# Patient Record
Sex: MALE | Race: WHITE
[De-identification: names, ages, dates, MRNs, and addresses within clinical notes are randomized per-mention and may not be internally consistent; named-entity substitution may affect disease eponyms.]

---

## 2017-08-16 ENCOUNTER — HOSPITAL ENCOUNTER (OUTPATIENT)
Dept: HOSPITAL 62 - END | Age: 82
Discharge: HOME | End: 2017-08-16
Attending: INTERNAL MEDICINE
Payer: COMMERCIAL

## 2017-08-16 VITALS — SYSTOLIC BLOOD PRESSURE: 142 MMHG | DIASTOLIC BLOOD PRESSURE: 71 MMHG

## 2017-08-16 DIAGNOSIS — Z87.891: ICD-10-CM

## 2017-08-16 DIAGNOSIS — Z79.899: ICD-10-CM

## 2017-08-16 DIAGNOSIS — K52.9: Primary | ICD-10-CM

## 2017-08-16 DIAGNOSIS — K64.8: ICD-10-CM

## 2017-08-16 DIAGNOSIS — Z88.0: ICD-10-CM

## 2017-08-16 DIAGNOSIS — Z85.850: ICD-10-CM

## 2017-08-16 DIAGNOSIS — N40.0: ICD-10-CM

## 2017-08-16 DIAGNOSIS — E78.2: ICD-10-CM

## 2017-08-16 DIAGNOSIS — K57.30: ICD-10-CM

## 2017-08-16 DIAGNOSIS — I25.10: ICD-10-CM

## 2017-08-16 DIAGNOSIS — G47.33: ICD-10-CM

## 2017-08-16 DIAGNOSIS — Z79.82: ICD-10-CM

## 2017-08-16 DIAGNOSIS — E89.0: ICD-10-CM

## 2017-08-16 PROCEDURE — 0DBF8ZX EXCISION OF RIGHT LARGE INTESTINE, VIA NATURAL OR ARTIFICIAL OPENING ENDOSCOPIC, DIAGNOSTIC: ICD-10-PCS | Performed by: INTERNAL MEDICINE

## 2017-08-16 PROCEDURE — 88305 TISSUE EXAM BY PATHOLOGIST: CPT

## 2017-08-16 PROCEDURE — 45380 COLONOSCOPY AND BIOPSY: CPT

## 2017-08-16 RX ADMIN — MIDAZOLAM HYDROCHLORIDE ONE MG: 1 INJECTION, SOLUTION INTRAMUSCULAR; INTRAVENOUS at 11:46

## 2017-08-16 RX ADMIN — MIDAZOLAM HYDROCHLORIDE ONE MG: 1 INJECTION, SOLUTION INTRAMUSCULAR; INTRAVENOUS at 11:51

## 2017-08-16 NOTE — OPERATIVE REPORT
Operative Report


DATE OF SURGERY: 08/16/17


Operative Report: 





The risks, benefits and alternatives of the procedure including risks of 

bleeding, perforation requiring surgery are explained to the patient detail and 

informed consent is obtained.  Patient was taken back to the endoscopy suite 

and placed in the left, lateral decubital position.  Timeout was called.  

Conscious sedation medications are provided.  A rectal examination is done 

which did not reveal any masses, tears or fissures.  An Olympus videoscope was 

inserted into the patient's rectum.  The scope was then carefully advanced all 

the way to the cecum.  The cecum was identified by the usual anatomical 

landmarks including the ileocecal valve as well as the appendiceal office.  

Photodocumentation was obtained.  Prep was good.  Scope was then sequentially 

pulled back via the various segments of the colon including the ascending colon

, hepatic flexure, transverse colon, splenic flexure, descending colon finding 

to the rectosigmoid portions of the colon.  Retroflexion maneuver is performed.


Patient does have a long redundant colon.


PREOPERATIVE DIAGNOSIS: Personal history of polyp.  Weight loss.  Change of 

bowel habits.  Diarrhea


POSTOPERATIVE DIAGNOSIS: Right side inflammation status post biopsy rule out 

lymphocytic, collagenous colitis.  Sigmoid diverticulosis.  Internal hemorrhoids


OPERATION: Colonoscopy with biopsy


SURGEON: KRISTAL REEDER


ANESTHESIA: Moderate Sedation - 3 mg of Versed, 50 mcg of fentanyl.  Conscious 

sedation monitoring time 30 minutes.


TISSUE REMOVED OR ALTERED: Right colon specimen obtained


COMPLICATIONS: 





None.


ESTIMATED BLOOD LOSS: None.


INTRAOPERATIVE FINDINGS: As described above.


PROCEDURE: 





Patient tolerated procedure well.


No immediate postprocedure complications are noted.


Patient discharged in good condition.


Discharge date 8/16/2017.


Discharge diet: Regular.


Discharge activity: Regular.


2-3 week follow-up to discuss findings.


Patient is instructed to call the office or proceed to the emergency room 

should there be any further problems or questions.


We will wait on pathology.

## 2018-10-18 ENCOUNTER — HOSPITAL ENCOUNTER (OUTPATIENT)
Dept: HOSPITAL 62 - OD | Age: 83
End: 2018-10-18
Attending: NURSE PRACTITIONER
Payer: MEDICARE

## 2018-10-18 DIAGNOSIS — M54.42: Primary | ICD-10-CM

## 2018-10-18 DIAGNOSIS — R10.32: ICD-10-CM

## 2018-10-18 PROCEDURE — 72110 X-RAY EXAM L-2 SPINE 4/>VWS: CPT

## 2018-10-18 NOTE — RADIOLOGY REPORT (SQ)
EXAM DESCRIPTION:  HIP LEFT AP/LATERAL



COMPLETED DATE/TIME:  10/18/2018 12:27 pm



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
ADRANT PAIN M54.42  LUMBAGO WITH SCIATICA, LEFT SIDE



COMPARISON:  Lumbar spine films same date

CT abdomen pelvis 8/11/2014



NUMBER OF VIEWS:  Two views.



TECHNIQUE:  AP pelvis and additional frog-leg view of the left hip.



LIMITATIONS:  None.



FINDINGS:  MINERALIZATION: Osteopenic

LEFT HIP: No fracture or dislocation.  No worrisome bone lesions.  Mild joint space narrowing is pres
ent with bony spurring along the acetabular rim.

RIGHT HIP: No fracture or dislocation.  No worrisome bone lesions.  Mild joint space narrowing withou
t significant bony spurring

PUBIS AND ISCHIUM: No fracture.

PELVIS: No fracture.

SACRUM: No fracture or dislocation. No worrisome bone lesions.

LOWER LUMBAR SPINE: Not included in the field of view

SOFT TISSUES: Penile prosthesis hardware

OTHER: No other significant finding.



IMPRESSION:  No acute fracture or malalignment.  Mild bilateral hip joint space narrowing.  Left acet
abular rim bony spurring



TECHNICAL DOCUMENTATION:  JOB ID:  [id number]

 2011 [employer]- All Rights Reserved



Reading location - IP/workstation name: [email or web address]

## 2018-10-18 NOTE — RADIOLOGY REPORT (SQ)
EXAM DESCRIPTION:  LUMBAR SPINE COMPLETE



COMPLETED DATE/TIME:  10/18/2018 12:27 pm



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
ADRANT PAIN M54.42  LUMBAGO WITH SCIATICA, LEFT SIDE



COMPARISON:  None.



NUMBER OF VIEWS:  Five views including obliques.



TECHNIQUE:  AP, lateral, oblique, and sacral radiographic images acquired of the lumbar spine.



LIMITATIONS:  None.



FINDINGS:  MINERALIZATION: Osteopenic

SEGMENTATION: Normal.  No transitional anatomy.

ALIGNMENT: Normal.

VERTEBRAE: Maintained height.  No fracture or worrisome bone lesion.

DISCS: High-grade disc space loss of height at L5-S1 with vacuum phenomenon.  Moderate disc space los
s of height from T11-12 through L1 to with bulky anterior osteophytes.

POSTERIOR ELEMENTS: Marked bilateral facet arthropathy at L5-S1.  Moderate bilateral facet arthropath
y from L2-3 through L4-5.

HARDWARE: None in the spine.

PARASPINAL SOFT TISSUES: Calcified abdominal aorta without calcified aneurysm

PELVIS: Not in the field of view.  SI joints unremarkable

OTHER: No other significant finding.



IMPRESSION:  Lower lumbar facet arthropathy and degenerative disc changes



TECHNICAL DOCUMENTATION:  JOB ID:  [id number]

 2011 [employer]- All Rights Reserved



Reading location - IP/workstation name: [email or web address]

## 2018-11-07 ENCOUNTER — HOSPITAL ENCOUNTER (OUTPATIENT)
Dept: HOSPITAL 62 - RAD | Age: 83
End: 2018-11-07
Attending: PHYSICIAN ASSISTANT
Payer: MEDICARE

## 2018-11-07 DIAGNOSIS — S32.9XXA: Primary | ICD-10-CM

## 2018-11-07 DIAGNOSIS — N40.0: ICD-10-CM
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
## 2018-11-07 DIAGNOSIS — R10.2: ICD-10-CM

## 2018-11-07 PROCEDURE — 72195 MRI PELVIS W/O DYE: CPT

## 2018-11-07 NOTE — RADIOLOGY REPORT (SQ)
EXAM DESCRIPTION:  MRI PELVIS WITHOUT



COMPLETED DATE/TIME:  11/7/2018 8:02 am



REASON FOR STUDY:  PELVIC PAIN, FX OF UNSPEC LUMBOSACRAL SPINE AND PELVIS (S32.9XXA) S32.9XXA  FRACTU
RE OF UNSP PARTS OF LUMBOSACRAL SPINE AND PEL



COMPARISON:  None.



TECHNIQUE:  Multiplanar multisequence imaging without contrast including axial and coronal fat sat T2
, axial and coronal T1.



CONTRAST TYPE AND DOSE:  None.



RENAL FUNCTION:  Not applicable.



LIMITATIONS:  None.



FINDINGS:  PROSTATE:  Prostate is enlarged.  It demonstrates heterogeneous signal intensity.  It inde
nts the base of the bladder.  The prostate measures 7.5 x 6.7 cm.

SEMINAL VESICLES: Normal.

PELVIS: No masses.  No adenopathy.

BLADDER: Normal.

PELVIS SKELETAL STRUCTURES: There is marrow replacement and expansion involving the posterior element
s at L5 and possibly S1.  Further evaluation with biopsy is recommended.

EXTRA PELVIC SOFT TISSUES: No masses.

OTHER: No other significant finding.



IMPRESSION:  Expansile lesion involving the posterior elements at L5/S1.  Neoplasm is suspected.  Bio
psy is recommended.

Enlarged prostate gland which demonstrates heterogeneous signal intensity.  Prostate measures 7.5 x 6
.7 cm.



COMMENT:   This report was called to RC THOMASON at09:20 on 11/7/2018.



TECHNICAL DOCUMENTATION:  JOB ID:  [id number]

 2011 [employer]- All Rights Reserved



Reading location - IP/workstation name: [email or web address]

## 2018-11-16 ENCOUNTER — HOSPITAL ENCOUNTER (OUTPATIENT)
Dept: HOSPITAL 62 - RAD | Age: 83
End: 2018-11-16
Attending: ORTHOPAEDIC SURGERY
Payer: MEDICARE

## 2018-11-16 DIAGNOSIS — C49.20: Primary | ICD-10-CM

## 2018-11-16 DIAGNOSIS — M89.9: ICD-10-CM

## 2018-11-16 PROCEDURE — 72148 MRI LUMBAR SPINE W/O DYE: CPT

## 2018-11-16 PROCEDURE — 82565 ASSAY OF CREATININE: CPT

## 2018-11-16 NOTE — RADIOLOGY REPORT (SQ)
EXAM DESCRIPTION:  MRI LUMBAR SPINE WITHOUT



COMPLETED DATE/TIME:  11/16/2018 3:10 pm



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  
MALIG NEOPLM OF CONN AND SOFT TISS OF UNSP LOW LIMB,  M89.9  DISORDER OF BONE, UNSPECIFIED



COMPARISON:  CT abdomen pelvis 8/11/2014

Lumbar spine plain films 10/18/2018

MRI pelvis 11/7/2018



TECHNIQUE:  Sagittal and Axial imaging includes T1, T2, STIR and gradient echo sequences. Coronal T2/
HASTE imaging.

We were unable to give IV contrast due to poor renal function



LIMITATIONS:  None.



FINDINGS:  VISUALIZED UPPER ABDOMEN:  Limited evaluation. No acute or suspicious findings suggested.

SEGMENTATION: No transitional anatomy. The lowest well-developed disc space is labeled L5-S1.

ALIGNMENT: Anatomic.

VERTEBRAE: Intact.

BONE MARROW: Expansile lytic lesion in the posterior elements at S1 and S2.

DISC SIGNAL: Diffuse decreased T2 weighted intervertebral disc signal.  Disc space loss of height at 
L5-S1

POSTERIOR ELEMENTS:  Expansile lytic lesion in the posterior elements at S1 and S2

HARDWARE: None in the spine.

CORD AND CONUS: Normal in size and signal intensity. Conus at the L1 level.

SOFT TISSUES: No aortic aneurysm seen. No bulky retroperitoneal adenopathy or mass. No paraspinal mas
s or fluid.

T11-12:  Broad diffuse posterior disc bulging and moderate bilateral facet hypertrophy causes borderl
ine central canal narrowing and mild-to-moderate bilateral foraminal narrowing.

T12-L1:  Minimal posterior disc bulging, moderate bilateral facet and ligament hypertrophy.  No centr
al stenosis.  Mild bilateral foraminal narrowing

L1-L2: No central stenosis.  Moderate bilateral facet hypertrophy with mild bilateral foraminal narro
wing

L2-L3: Minimal diffuse posterior disc bulging and moderate bilateral facet and ligament hypertrophy. 
 Borderline central canal stenosis.  Mild bilateral foraminal narrowing.

L3-L4: Mild diffuse posterior disc bulge and moderate bilateral facet and ligament hypertrophy with b
orderline central canal stenosis.  Mild bilateral inferior foraminal narrowing.

L4-L5: Mild diffuse posterior disc bulging, moderate bilateral facet and ligament hypertrophy.  Mild 
central canal stenosis.  Moderate bilateral foraminal narrowing without exiting L4 nerve root impinge
ment

L5-S1: Broad diffuse posterior disc bulge and bony spurring with bulky bilateral facet and ligament h
ypertrophy.

SACRUM: Along the posterior elements of S1 and S2, an expansile mass is present measuring 6 cm transv
erse by 4.6 cm AP by 4.5 cm craniocaudad.  This is decreased signal compared to bone marrow 1 T1 weig
hted images, mixed increased and decreased signal compared to bone marrow 1 T2/stir images, expands t
he posterior elements with narrowing of the sacral spinal canal at the S1-2 level, with asymmetric na
rrowing of the left S1 neural foramen.  This finding is new compared to CT exam 8/11/2014 and unchang
ed from MRI pelvis 11/7/2018.  Differential is metastatic lesion versus primary bone tumor/plasmacyto
ma.  CT-guided biopsy of this lesion is recommended.

OTHER: No other significant findings.



IMPRESSION:  Multilevel lumbar spine degenerative changes

6 x 4.6 x 4.5 cm mass along the posterior elements of S1 and S2, worrisome for either metastatic dise
ase or primary bone tumor.  CT-guided biopsy recommended.



TECHNICAL DOCUMENTATION:  JOB ID:  [id number]

 2011 [employer]- All Rights Reserved



Reading location - IP/workstation name: [facility]

## 2020-03-26 ENCOUNTER — HOSPITAL ENCOUNTER (OUTPATIENT)
Dept: HOSPITAL 62 - OD | Age: 85
End: 2020-03-26
Payer: MEDICARE

## 2020-03-26 DIAGNOSIS — C90.00: Primary | ICD-10-CM

## 2020-03-26 LAB
ADD MANUAL DIFF: NO
ALBUMIN SERPL-MCNC: 4.2 G/DL (ref 3.5–5)
ALP SERPL-CCNC: 70 U/L (ref 38–126)
ANION GAP SERPL CALC-SCNC: 7 MMOL/L (ref 5–19)
AST SERPL-CCNC: 25 U/L (ref 17–59)
BASOPHILS # BLD AUTO: 0 10^3/UL (ref 0–0.2)
BASOPHILS NFR BLD AUTO: 0.8 % (ref 0–2)
BILIRUB DIRECT SERPL-MCNC: 0.3 MG/DL (ref 0–0.4)
BILIRUB SERPL-MCNC: 0.5 MG/DL (ref 0.2–1.3)
BUN SERPL-MCNC: 20 MG/DL (ref 7–20)
CALCIUM: 9.6 MG/DL (ref 8.4–10.2)
CHLORIDE SERPL-SCNC: 99 MMOL/L (ref 98–107)
CO2 SERPL-SCNC: 29 MMOL/L (ref 22–30)
EOSINOPHIL # BLD AUTO: 0.3 10^3/UL (ref 0–0.6)
EOSINOPHIL NFR BLD AUTO: 8 % (ref 0–6)
ERYTHROCYTE [DISTWIDTH] IN BLOOD BY AUTOMATED COUNT: 15.1 % (ref 11.5–14)
GLUCOSE SERPL-MCNC: 87 MG/DL (ref 75–110)
HCT VFR BLD CALC: 36.9 % (ref 37.9–51)
HGB BLD-MCNC: 12.8 G/DL (ref 13.5–17)
LYMPHOCYTES # BLD AUTO: 1 10^3/UL (ref 0.5–4.7)
LYMPHOCYTES NFR BLD AUTO: 31.2 % (ref 13–45)
MCH RBC QN AUTO: 33.6 PG (ref 27–33.4)
MCHC RBC AUTO-ENTMCNC: 34.8 G/DL (ref 32–36)
MCV RBC AUTO: 97 FL (ref 80–97)
MONOCYTES # BLD AUTO: 0.4 10^3/UL (ref 0.1–1.4)
MONOCYTES NFR BLD AUTO: 12.6 % (ref 3–13)
NEUTROPHILS # BLD AUTO: 1.6 10^3/UL (ref 1.7–8.2)
NEUTS SEG NFR BLD AUTO: 47.4 % (ref 42–78)
PLATELET # BLD: 153 10^3/UL (ref 150–450)
POTASSIUM SERPL-SCNC: 4.7 MMOL/L (ref 3.6–5)
PROT SERPL-MCNC: 7.2 G/DL (ref 6.3–8.2)
RBC # BLD AUTO: 3.82 10^6/UL (ref 4.35–5.55)
TOTAL CELLS COUNTED % (AUTO): 100 %
WBC # BLD AUTO: 3.3 10^3/UL (ref 4–10.5)

## 2020-03-26 PROCEDURE — 83883 ASSAY NEPHELOMETRY NOT SPEC: CPT

## 2020-03-26 PROCEDURE — 86320 SERUM IMMUNOELECTROPHORESIS: CPT

## 2020-03-26 PROCEDURE — 85025 COMPLETE CBC W/AUTO DIFF WBC: CPT

## 2020-03-26 PROCEDURE — 36415 COLL VENOUS BLD VENIPUNCTURE: CPT

## 2020-03-26 PROCEDURE — 80053 COMPREHEN METABOLIC PANEL: CPT

## 2020-03-27 LAB
ALBUMIN 3: 3.5 G/DL (ref 2.9–4.4)
ALBUMIN/GLOB SERPL: 1.3 {RATIO} (ref 0.7–1.7)
ALPHA1 GLOB SERPL ELPH-MCNC: 0.3 G/DL (ref 0–0.4)
B-GLOBULIN SERPL ELPH-MCNC: 1 G/DL (ref 0.7–1.3)
GAMMA GLOBULINS: 1 G/DL (ref 0.4–1.8)
IGA SERPL-MCNC: 106 MG/DL (ref 61–437)
IGG SERPL-MCNC: 1087 MG/DL (ref 700–1600)
IGM SERPL-MCNC: 49 MG/DL (ref 15–143)
KAPPA LC FREE SER-MCNC: 423.5 MG/L (ref 3.3–19.4)
KAPPA LC/LAMBDA SER: 37.48 {RATIO} (ref 0.26–1.65)
LAMBDA LC FREE SERPL-MCNC: 11.3 MG/L (ref 5.7–26.3)
PROT SERPL-MCNC: 6.4 G/DL (ref 6–8.5)

## 2020-05-09 ENCOUNTER — HOSPITAL ENCOUNTER (OUTPATIENT)
Dept: HOSPITAL 62 - RDC | Age: 85
End: 2020-05-09
Attending: NURSE PRACTITIONER
Payer: MEDICARE

## 2020-05-09 VITALS — DIASTOLIC BLOOD PRESSURE: 90 MMHG | SYSTOLIC BLOOD PRESSURE: 182 MMHG

## 2020-05-09 DIAGNOSIS — I10: ICD-10-CM

## 2020-05-09 DIAGNOSIS — J06.9: ICD-10-CM

## 2020-05-09 DIAGNOSIS — E78.00: ICD-10-CM

## 2020-05-09 DIAGNOSIS — Z20.828: Primary | ICD-10-CM

## 2020-05-09 DIAGNOSIS — C90.00: ICD-10-CM

## 2020-05-09 DIAGNOSIS — Z87.891: ICD-10-CM

## 2020-05-09 DIAGNOSIS — Z88.0: ICD-10-CM

## 2020-05-09 PROCEDURE — 87635 SARS-COV-2 COVID-19 AMP PRB: CPT

## 2020-05-09 NOTE — ER RDC ASSESSMENT REPORT
Intake





- In the Last 14 days


Have you traveled outside [place]?: No


Have you been in close contact with someone CONFIRMED: No


Worked in Healthcare?: No





- Symptoms


Subjective Fever(Felt feverish): No


Chills: No


Muscule Aches: No


Runny Nose: No


Sore Throat: No


Cough (New or worsening chronic cough): No


Shortness of breath: No


Nausea or Vomiting: No


Headache: No


Abdominal Pain: No


Diarrhea(3 or more loose stools in last 24 hours): No





- Do you have any of the following


Chronic lung disease: Asthma or emphysema or COPD: No


Cystic Fibrosis: No


Diabetes: No


High Blood Pressure: No


Cardiovascular Disease: No


Chronic Kidney Disease: No


Chronic Liver Disease: No


Chronic blood disorder like Sickle Cell Disease: No


Weak immune system due to disease or medication: Yes


Immune System Comment: 


Patient is a chemotherapy patient, with current cancer diagnosis of Multiple 

Myeloma. 


Neurologic condition that limits movement: No


Developmental delay - Moderate to Severe: No


Recent (within past 2 weeks) or current Pregnancy: No


Morbid Obesity (>100 pounds over ideal weight): No





- Objective


Temperature: 97 F


Pulse Rate: 56


Respiratory Rate: 16


Blood Pressure: 182/90 - Denies taking his blood pressure medication this a.m.


O2 Sat by Pulse Oximetry: 97


Objective: 


Patient is a well-appearing 84-year-old male, who presents today for COVID-19 

screening.


Disposition: Home; Selfcare





General





- General


Stated Complaint: [facility] requires him to have COVID 19 testing

prior to coming for his treatment


Mode of Arrival: Ambulatory


Information source: Patient


Notes: 


The patient was evaluated during the global COVID-19 pandemic. That diagnosis 

was suspected/considered upon initial presentation. Their evaluation, treatment,

and testing was consistent with current guidelines for patients who present with

complaints or symptoms that may be related to COVID-19.





Patient reports he is not experiencing COVID symptoms, however, he is being 

required by [facility] to be tested prior to going for his 

upcoming cheomtherapy treatment.





- HPI


Patient complains to provider of: Patient has no complaints


Quality of pain: No pain


Severity: None


Pain Level: Denies


Associated symptoms: None





- Related Data


Allergies/Adverse Reactions: 


                                        





Penicillins Allergy (Mild, Verified 08/16/17 10:56)


   HIVES RASH











Past Medical History





- Social History


Smoking Status: Former Smoker


Cigarette use (# per day): No - Patient quit smoking 30+ years ago


Chew tobacco use (# tins/day): No


Smoking Education Provided: No


Frequency of alcohol use: None


Drug Abuse: None


Occupation: Retired


Lives with: Family


Family History: Reviewed & Not Pertinent


Patient has suicidal ideation: No


Patient has homicidal ideation: No





- Past Medical History


Cardiac Medical History: Reports: Hx Hypercholesterolemia, Hx Hypertension - HX


   Denies: Hx Coronary Artery Disease


Pulmonary Medical History: Reports: Hx Pneumonia - AS CHILD


   Denies: Hx Asthma, Hx Bronchitis, Hx COPD


Neurological Medical History: Denies: Hx Cerebrovascular Accident, Hx Seizures


Malignancy Medical History: Reports Other - Current diagnosis of multiple 

myeloma


Musculoskeletal Medical History: Denies Hx Arthritis


Past Surgical History: Reports: Hx Appendectomy, Hx Cholecystectomy





Physical Exam





- General


In distress: None


Notes: 


PHYSICAL EXAMINATION: 


GENERAL: Well-appearing and in no acute distress. 


HEAD: Atraumatic, normocephalic. 


EYES: sclera anicteric, conjunctiva are normal. 


ENT: nares patent. Moist mucous membranes. 


NECK: Normal range of motion, supple without lymphadenopathy. 


LUNGS: CTAB and equal. No wheezes rales or rhonchi. 


HEART: Regular rate and rhythm without murmurs.  


EXTREMITIES: Normal range of motion, no pitting edema. No cyanosis. 


BACK: No midline or CVA tenderness.


NEUROLOGICAL: Cranial nerves grossly intact. Normal speech.


PSYCH: Normal mood, normal affect. 


SKIN: Warm, Dry, normal color and turgor, no obvious lesions or rash noted.








Diagnostic Results


Laboratory Results: 


Patient advised at this time they are considered a Person Under Investigation 

(PUI) for the COVID-19 Coronavirus. They have been made aware it is currently 

taking 5-7 days to receive their results, and The [facility] 

Department will call to advise them of their result, whether it is POSITIVE or 

NEGATIVE.





Patient Education/Counseling


Counseling/Education: 


Patient presents with upper respiratory symptoms worrisome for possible COVID-

19.  Patient does not have symptoms worrisome as an emergency such as difficulty

breathing, shortness of breath, chest pain, pressure, confusion or cyanosis.  

Patient appears suitable for discharge.  Patient's vital signs are stable and 

patient is nontoxic in appearance.  Good return precautions have been discussed 

with patient, patient verbalized understanding and is agreeable with discharge 

plan of care at this time.





Patient provided COVID-19 discharge instructions to include:





As a person under investigation for COVID-19, the [place] department of 

Health and Human Services, division of public health advises you to adhere to 

the following guidance until your test results are reported to you.  If your 

test result is positive, you will receive additional information from your 

provider and your local health department at that time.


 


Remain at home until you are cleared by the health provider or public health 

authorities.


 


Keep a log of visitors to your home, notify any visitors to your home of your 

isolation status.


 


If you plan to move to a new address or leave the county, notify the local 

health department in your County.


 


Call your doctor or seek care if you have an urgent medical need.  Before 

seeking medical care, call ahead to get instructions from the provider before 

arriving at the medical office clinic or hospital.  Notify them that you are 

being tested for the virus that causes COVID-19 so that arrangements can be 

made, as necessary, to prevent transmission to others in the healthcare setting.

 Next, notify the local health department in your county.


 


If a medical emergency arises and you need to call 911, inform dispatch and the 

first responders that you are being tested for the virus that causes COVID-19.  

Next, notify the local health department in your county.


Guidance for worsening S/SX: 


For worsening symptoms, patient has been advised to contact their Primary Care 

Provider, or go to the nearest Emergency Department.





RDC Discharge





- Discharge


Clinical Impression: 


URI (upper respiratory infection)


Qualifiers:


 URI type: unspecified URI Qualified Code(s): J06.9 - Acute upper respiratory 

infection, unspecified





Condition: Stable


Disposition: Home; Selfcare

## 2020-05-27 ENCOUNTER — HOSPITAL ENCOUNTER (OUTPATIENT)
Dept: HOSPITAL 62 - OD | Age: 85
End: 2020-05-27
Attending: NURSE PRACTITIONER
Payer: MEDICARE

## 2020-05-27 DIAGNOSIS — C90.01: Primary | ICD-10-CM

## 2020-05-27 LAB
ADD MANUAL DIFF: NO
ALBUMIN SERPL-MCNC: 3.7 G/DL (ref 3.5–5)
ALP SERPL-CCNC: 66 U/L (ref 38–126)
ANION GAP SERPL CALC-SCNC: 5 MMOL/L (ref 5–19)
AST SERPL-CCNC: 21 U/L (ref 17–59)
BASOPHILS # BLD AUTO: 0 10^3/UL (ref 0–0.2)
BASOPHILS NFR BLD AUTO: 0.6 % (ref 0–2)
BILIRUB DIRECT SERPL-MCNC: 0 MG/DL (ref 0–0.4)
BILIRUB SERPL-MCNC: 0.9 MG/DL (ref 0.2–1.3)
BUN SERPL-MCNC: 15 MG/DL (ref 7–20)
CALCIUM: 8.6 MG/DL (ref 8.4–10.2)
CHLORIDE SERPL-SCNC: 98 MMOL/L (ref 98–107)
CO2 SERPL-SCNC: 29 MMOL/L (ref 22–30)
EOSINOPHIL # BLD AUTO: 0.3 10^3/UL (ref 0–0.6)
EOSINOPHIL NFR BLD AUTO: 8.1 % (ref 0–6)
ERYTHROCYTE [DISTWIDTH] IN BLOOD BY AUTOMATED COUNT: 13.4 % (ref 11.5–14)
GLUCOSE SERPL-MCNC: 98 MG/DL (ref 75–110)
HCT VFR BLD CALC: 36 % (ref 37.9–51)
HGB BLD-MCNC: 12.5 G/DL (ref 13.5–17)
LYMPHOCYTES # BLD AUTO: 0.8 10^3/UL (ref 0.5–4.7)
LYMPHOCYTES NFR BLD AUTO: 22.3 % (ref 13–45)
MCH RBC QN AUTO: 33.9 PG (ref 27–33.4)
MCHC RBC AUTO-ENTMCNC: 34.7 G/DL (ref 32–36)
MCV RBC AUTO: 98 FL (ref 80–97)
MONOCYTES # BLD AUTO: 0.6 10^3/UL (ref 0.1–1.4)
MONOCYTES NFR BLD AUTO: 17.1 % (ref 3–13)
NEUTROPHILS # BLD AUTO: 1.8 10^3/UL (ref 1.7–8.2)
NEUTS SEG NFR BLD AUTO: 51.9 % (ref 42–78)
PLATELET # BLD: 118 10^3/UL (ref 150–450)
POTASSIUM SERPL-SCNC: 5 MMOL/L (ref 3.6–5)
PROT SERPL-MCNC: 6.3 G/DL (ref 6.3–8.2)
RBC # BLD AUTO: 3.68 10^6/UL (ref 4.35–5.55)
TOTAL CELLS COUNTED % (AUTO): 100 %
WBC # BLD AUTO: 3.4 10^3/UL (ref 4–10.5)

## 2020-05-27 PROCEDURE — 83735 ASSAY OF MAGNESIUM: CPT

## 2020-05-27 PROCEDURE — 84100 ASSAY OF PHOSPHORUS: CPT

## 2020-05-27 PROCEDURE — 85025 COMPLETE CBC W/AUTO DIFF WBC: CPT

## 2020-05-27 PROCEDURE — 80053 COMPREHEN METABOLIC PANEL: CPT

## 2020-05-27 PROCEDURE — 36415 COLL VENOUS BLD VENIPUNCTURE: CPT

## 2020-06-25 ENCOUNTER — HOSPITAL ENCOUNTER (OUTPATIENT)
Dept: HOSPITAL 62 - OD | Age: 85
End: 2020-06-25
Attending: INTERNAL MEDICINE
Payer: MEDICARE

## 2020-06-25 DIAGNOSIS — C90.02: Primary | ICD-10-CM

## 2020-06-25 LAB
ADD MANUAL DIFF: NO
ALBUMIN SERPL-MCNC: 3.7 G/DL (ref 3.5–5)
ALP SERPL-CCNC: 71 U/L (ref 38–126)
ANION GAP SERPL CALC-SCNC: 6 MMOL/L (ref 5–19)
AST SERPL-CCNC: 17 U/L (ref 17–59)
BASOPHILS # BLD AUTO: 0 10^3/UL (ref 0–0.2)
BASOPHILS NFR BLD AUTO: 0.4 % (ref 0–2)
BILIRUB DIRECT SERPL-MCNC: 0 MG/DL (ref 0–0.4)
BILIRUB SERPL-MCNC: 0.6 MG/DL (ref 0.2–1.3)
BUN SERPL-MCNC: 26 MG/DL (ref 7–20)
CALCIUM: 9.2 MG/DL (ref 8.4–10.2)
CHLORIDE SERPL-SCNC: 97 MMOL/L (ref 98–107)
CO2 SERPL-SCNC: 27 MMOL/L (ref 22–30)
EOSINOPHIL # BLD AUTO: 0 10^3/UL (ref 0–0.6)
EOSINOPHIL NFR BLD AUTO: 0.2 % (ref 0–6)
ERYTHROCYTE [DISTWIDTH] IN BLOOD BY AUTOMATED COUNT: 13.4 % (ref 11.5–14)
GLUCOSE SERPL-MCNC: 162 MG/DL (ref 75–110)
HCT VFR BLD CALC: 35.1 % (ref 37.9–51)
HGB BLD-MCNC: 12.1 G/DL (ref 13.5–17)
LYMPHOCYTES # BLD AUTO: 0.7 10^3/UL (ref 0.5–4.7)
LYMPHOCYTES NFR BLD AUTO: 16.4 % (ref 13–45)
MCH RBC QN AUTO: 33.2 PG (ref 27–33.4)
MCHC RBC AUTO-ENTMCNC: 34.3 G/DL (ref 32–36)
MCV RBC AUTO: 97 FL (ref 80–97)
MONOCYTES # BLD AUTO: 0.1 10^3/UL (ref 0.1–1.4)
MONOCYTES NFR BLD AUTO: 3.1 % (ref 3–13)
NEUTROPHILS # BLD AUTO: 3.2 10^3/UL (ref 1.7–8.2)
NEUTS SEG NFR BLD AUTO: 79.9 % (ref 42–78)
PHOSPHATE SERPL-MCNC: 3.2 MG/DL (ref 2.5–4.5)
PLATELET # BLD: 162 10^3/UL (ref 150–450)
POTASSIUM SERPL-SCNC: 4.5 MMOL/L (ref 3.6–5)
PROT SERPL-MCNC: 6.4 G/DL (ref 6.3–8.2)
RBC # BLD AUTO: 3.64 10^6/UL (ref 4.35–5.55)
TOTAL CELLS COUNTED % (AUTO): 100 %
WBC # BLD AUTO: 4 10^3/UL (ref 4–10.5)

## 2020-06-25 PROCEDURE — 36415 COLL VENOUS BLD VENIPUNCTURE: CPT

## 2020-06-25 PROCEDURE — 83735 ASSAY OF MAGNESIUM: CPT

## 2020-06-25 PROCEDURE — 84100 ASSAY OF PHOSPHORUS: CPT

## 2020-06-25 PROCEDURE — 80053 COMPREHEN METABOLIC PANEL: CPT

## 2020-06-25 PROCEDURE — 85025 COMPLETE CBC W/AUTO DIFF WBC: CPT

## 2020-07-22 ENCOUNTER — HOSPITAL ENCOUNTER (OUTPATIENT)
Dept: HOSPITAL 62 - OD | Age: 85
End: 2020-07-22
Attending: INTERNAL MEDICINE
Payer: MEDICARE

## 2020-07-22 DIAGNOSIS — C90.02: Primary | ICD-10-CM

## 2020-07-22 LAB
ADD MANUAL DIFF: NO
BASOPHILS # BLD AUTO: 0.1 10^3/UL (ref 0–0.2)
BASOPHILS NFR BLD AUTO: 2.3 % (ref 0–2)
EOSINOPHIL # BLD AUTO: 0.6 10^3/UL (ref 0–0.6)
EOSINOPHIL NFR BLD AUTO: 15.5 % (ref 0–6)
ERYTHROCYTE [DISTWIDTH] IN BLOOD BY AUTOMATED COUNT: 13.9 % (ref 11.5–14)
HCT VFR BLD CALC: 32.9 % (ref 37.9–51)
HGB BLD-MCNC: 11.1 G/DL (ref 13.5–17)
LYMPHOCYTES # BLD AUTO: 0.7 10^3/UL (ref 0.5–4.7)
LYMPHOCYTES NFR BLD AUTO: 20.1 % (ref 13–45)
MCH RBC QN AUTO: 32.7 PG (ref 27–33.4)
MCHC RBC AUTO-ENTMCNC: 33.8 G/DL (ref 32–36)
MCV RBC AUTO: 97 FL (ref 80–97)
MONOCYTES # BLD AUTO: 0.6 10^3/UL (ref 0.1–1.4)
MONOCYTES NFR BLD AUTO: 17.3 % (ref 3–13)
NEUTROPHILS # BLD AUTO: 1.6 10^3/UL (ref 1.7–8.2)
NEUTS SEG NFR BLD AUTO: 44.8 % (ref 42–78)
PLATELET # BLD: 138 10^3/UL (ref 150–450)
RBC # BLD AUTO: 3.4 10^6/UL (ref 4.35–5.55)
TOTAL CELLS COUNTED % (AUTO): 100 %
WBC # BLD AUTO: 3.6 10^3/UL (ref 4–10.5)

## 2020-07-22 PROCEDURE — 85025 COMPLETE CBC W/AUTO DIFF WBC: CPT

## 2020-07-22 PROCEDURE — 36415 COLL VENOUS BLD VENIPUNCTURE: CPT

## 2020-08-26 ENCOUNTER — HOSPITAL ENCOUNTER (OUTPATIENT)
Dept: HOSPITAL 62 - OD | Age: 85
End: 2020-08-26
Attending: INTERNAL MEDICINE
Payer: MEDICARE

## 2020-08-26 DIAGNOSIS — C90.02: Primary | ICD-10-CM

## 2020-08-26 LAB
ADD MANUAL DIFF: NO
BASOPHILS # BLD AUTO: 0 10^3/UL (ref 0–0.2)
BASOPHILS NFR BLD AUTO: 0.9 % (ref 0–2)
EOSINOPHIL # BLD AUTO: 0.6 10^3/UL (ref 0–0.6)
EOSINOPHIL NFR BLD AUTO: 15.8 % (ref 0–6)
ERYTHROCYTE [DISTWIDTH] IN BLOOD BY AUTOMATED COUNT: 15.4 % (ref 11.5–14)
HCT VFR BLD CALC: 34.2 % (ref 37.9–51)
HGB BLD-MCNC: 11.5 G/DL (ref 13.5–17)
LYMPHOCYTES # BLD AUTO: 0.9 10^3/UL (ref 0.5–4.7)
LYMPHOCYTES NFR BLD AUTO: 23.1 % (ref 13–45)
MCH RBC QN AUTO: 32.2 PG (ref 27–33.4)
MCHC RBC AUTO-ENTMCNC: 33.8 G/DL (ref 32–36)
MCV RBC AUTO: 95 FL (ref 80–97)
MONOCYTES # BLD AUTO: 0.7 10^3/UL (ref 0.1–1.4)
MONOCYTES NFR BLD AUTO: 18.3 % (ref 3–13)
NEUTROPHILS # BLD AUTO: 1.7 10^3/UL (ref 1.7–8.2)
NEUTS SEG NFR BLD AUTO: 41.9 % (ref 42–78)
PLATELET # BLD: 142 10^3/UL (ref 150–450)
RBC # BLD AUTO: 3.58 10^6/UL (ref 4.35–5.55)
TOTAL CELLS COUNTED % (AUTO): 100 %
WBC # BLD AUTO: 4 10^3/UL (ref 4–10.5)

## 2020-08-26 PROCEDURE — 36415 COLL VENOUS BLD VENIPUNCTURE: CPT

## 2020-08-26 PROCEDURE — 85025 COMPLETE CBC W/AUTO DIFF WBC: CPT
